# Patient Record
(demographics unavailable — no encounter records)

---

## 2025-02-07 NOTE — IMAGING
[de-identified] : The patient is a well appearing 19 year  old male of their stated age. Patient ambulates with a normal gait.   Ribs: Deformity: None Tenderness to Palpation: None Rib Compression Test: Negative   Thoracic Spine: Range of Motion: Unrestricted Deformity: None Tenderness to Palpation: None Sensation: Intact to Light Touch   Lumbar Spine: RANGE OF MOTION:      Flexion: Unrestricted and without pain      Extension: Unrestricted and without pain      Rotation: Unrestricted and without pain   TENDERNESS TO PALPATION:      Midline/Vertebral Bodies/Spinous Processes: Negative      Paraspinal Muscles: TENDER, LEFT LOWER      SI Joints:  Negative   PROVOCATIVE TESTING:      Piriformis Stretch Test: Negative      Straight Leg Raise:  Negative      Seated Straight Leg Raise: Negative      Pelvic Compression Test: Negative      INSPECTION:         Deformity: Negative         Erythema: Negative         Ecchymosis: Negative         Abrasions: Negative         Muscle Spasm: POSITIVE          NEUROLOGIC EXAM:         Sensation L2-S1: Grossly Intact         DTRs: 2+ and Symmetric         Babinski: Negative         Clonus: Negative   MOTOR EXAM:         Quadriceps: 5 out of 5         Hamstrings: 5 out of 5         Hip ABduction: 5 out of 5         Hip ADduction: 5 out of 5         Hip Flexion: 5 out of 5         TA: 5 out of 5         GS: 5 out of 5         EHL: 5 out of 5         FHL: 5 out of 5   Circulatory/Pulses:         Dorsalis Pedis:      2+         Posterior Tibialis: 2+     Assessment: The patient is a 19-year-old male with lower back pain and radiographic and physical exam findings consistent with lumbar strain.  The patient's condition is acute Documents/Results Reviewed Today: X-Ray lumbar spine  Tests/Studies Independently Interpreted Today: X-Ray lumbar spine is benign, no obvious bony abnormalities.  Pertinent findings include: tender left sided lumbar paraspinal muscles, nontender spinous process, no pain at hyperextension, intact strength throughout bilateral lower extremities  Confounding medical conditions/concerns: None   Plan: Advised the patient that his clinical examination and radiographic imaging is consistent with a paraspinal muscle strain. We are not concerned for any bony stress related injury at this time given nontender vertebral processes. Patient will start physical therapy, HEP, and stretching. Prescribed patient Naproxen 500mg BID x 2 weeks, then PRN for pain management and inflammation - use as directed and take with food. We will follow him accordingly in [employer] training room. He will shut down for one week. Modify activity as discussed.  Tests Ordered: None  Prescription Medications Ordered: Naproxen 500mg  Braces/DME Ordered: None Activity/Work/Sports Status: Shut down for one week  Additional Instructions: None Follow-Up: Next Wednesday in training room   The patient's current medication management of their orthopedic diagnosis was reviewed today: The patient was prescribed Naprosyn 500mg BID for two weeks and then as needed.  (1) We discussed a comprehensive treatment plan that included possible pharmaceutical management involving the use of prescription strength medications including but not limited to options such as oral Naprosyn 500mg BID, once daily Meloxicam 15 mg, or 500-650 mg Tylenol versus over the counter oral medications and topical prescription NSAID Pennsaid vs over the counter Voltaren gel.  Based on our extensive discussion, the patient was prescribed Naprosyn 500mg BID for two weeks.  It will then be used PRN for pain, inflammation and discomfort. (2) There is a moderate risk of morbidity with further treatment, especially from use of prescription strength medications and possible side effects of these medications which include upset stomach with oral medications, skin reactions to topical medications and cardiac/renal issues with long term use. (3) I recommended that the patient follow-up with their medical physician to discuss any significant specific potential issues with long term medication use such as interactions with current medications or with exacerbation of underlying medical comorbidities. (4) The benefits and risks associated with use of injectable, oral or topical, prescription and over the counter anti-inflammatory medications were discussed with the patient. The patient voiced understanding of the risks including but not limited to bleeding, stroke, kidney dysfunction, heart disease, and were referred to the black box warning label for further information.  Gayle COLE attest that this documentation has been prepared under the direction and in the presence of Provider Dr. Theo Wynne.   The documentation recorded by the scribe accurately reflects the services IDr. Theo, personally performed and the decisions made by me.  [FreeTextEntry1] : X-Ray lumbar spine is benign, no obvious bony abnormalities.

## 2025-02-07 NOTE — IMAGING
[de-identified] : The patient is a well appearing 19 year  old male of their stated age. Patient ambulates with a normal gait.   Ribs: Deformity: None Tenderness to Palpation: None Rib Compression Test: Negative   Thoracic Spine: Range of Motion: Unrestricted Deformity: None Tenderness to Palpation: None Sensation: Intact to Light Touch   Lumbar Spine: RANGE OF MOTION:      Flexion: Unrestricted and without pain      Extension: Unrestricted and without pain      Rotation: Unrestricted and without pain   TENDERNESS TO PALPATION:      Midline/Vertebral Bodies/Spinous Processes: Negative      Paraspinal Muscles: TENDER, LEFT LOWER      SI Joints:  Negative   PROVOCATIVE TESTING:      Piriformis Stretch Test: Negative      Straight Leg Raise:  Negative      Seated Straight Leg Raise: Negative      Pelvic Compression Test: Negative      INSPECTION:         Deformity: Negative         Erythema: Negative         Ecchymosis: Negative         Abrasions: Negative         Muscle Spasm: POSITIVE          NEUROLOGIC EXAM:         Sensation L2-S1: Grossly Intact         DTRs: 2+ and Symmetric         Babinski: Negative         Clonus: Negative   MOTOR EXAM:         Quadriceps: 5 out of 5         Hamstrings: 5 out of 5         Hip ABduction: 5 out of 5         Hip ADduction: 5 out of 5         Hip Flexion: 5 out of 5         TA: 5 out of 5         GS: 5 out of 5         EHL: 5 out of 5         FHL: 5 out of 5   Circulatory/Pulses:         Dorsalis Pedis:      2+         Posterior Tibialis: 2+     Assessment: The patient is a 19-year-old male with lower back pain and radiographic and physical exam findings consistent with lumbar strain.  The patient's condition is acute Documents/Results Reviewed Today: X-Ray lumbar spine  Tests/Studies Independently Interpreted Today: X-Ray lumbar spine is benign, no obvious bony abnormalities.  Pertinent findings include: tender left sided lumbar paraspinal muscles, nontender spinous process, no pain at hyperextension, intact strength throughout bilateral lower extremities  Confounding medical conditions/concerns: None   Plan: Advised the patient that his clinical examination and radiographic imaging is consistent with a paraspinal muscle strain. We are not concerned for any bony stress related injury at this time given nontender vertebral processes. Patient will start physical therapy, HEP, and stretching. Prescribed patient Naproxen 500mg BID x 2 weeks, then PRN for pain management and inflammation - use as directed and take with food. We will follow him accordingly in [employer] training room. He will shut down for one week. Modify activity as discussed.  Tests Ordered: None  Prescription Medications Ordered: Naproxen 500mg  Braces/DME Ordered: None Activity/Work/Sports Status: Shut down for one week  Additional Instructions: None Follow-Up: Next Wednesday in training room   The patient's current medication management of their orthopedic diagnosis was reviewed today: The patient was prescribed Naprosyn 500mg BID for two weeks and then as needed.  (1) We discussed a comprehensive treatment plan that included possible pharmaceutical management involving the use of prescription strength medications including but not limited to options such as oral Naprosyn 500mg BID, once daily Meloxicam 15 mg, or 500-650 mg Tylenol versus over the counter oral medications and topical prescription NSAID Pennsaid vs over the counter Voltaren gel.  Based on our extensive discussion, the patient was prescribed Naprosyn 500mg BID for two weeks.  It will then be used PRN for pain, inflammation and discomfort. (2) There is a moderate risk of morbidity with further treatment, especially from use of prescription strength medications and possible side effects of these medications which include upset stomach with oral medications, skin reactions to topical medications and cardiac/renal issues with long term use. (3) I recommended that the patient follow-up with their medical physician to discuss any significant specific potential issues with long term medication use such as interactions with current medications or with exacerbation of underlying medical comorbidities. (4) The benefits and risks associated with use of injectable, oral or topical, prescription and over the counter anti-inflammatory medications were discussed with the patient. The patient voiced understanding of the risks including but not limited to bleeding, stroke, kidney dysfunction, heart disease, and were referred to the black box warning label for further information.  Gayle COLE attest that this documentation has been prepared under the direction and in the presence of Provider Dr. Theo Wynne.   The documentation recorded by the scribe accurately reflects the services IDr. Theo, personally performed and the decisions made by me.  [FreeTextEntry1] : X-Ray lumbar spine is benign, no obvious bony abnormalities.
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
## 2025-04-25 NOTE — HISTORY OF PRESENT ILLNESS
[de-identified] : The patient is a 20 year old right hand dominant male who presents today complaining of right elbow pain.  Date of Injury/Onset: 4/21/25 Pain:    At Rest: 0/10  With Activity:  6/10  Mechanism of injury: Gradual onset of pain with playing tennis  This is NOT a Work Related Injury being treated under Worker's Compensation. This IS an athletic injury occurring associated with an interscholastic or organized sports team. Quality of symptoms: medial elbow pain Improves with: rest Worse with: hitting octaviano when serving  Prior treatment: none Prior Imaging: none Out of work/sport: Currently playing sports modified (doubles only) School/Sport/Position/Occupation: [employer]  Additional Information: None

## 2025-04-25 NOTE — IMAGING
[Right] : right elbow [de-identified] : The patient is a well appearing 20 year old male of their stated age. Neck is supple & nontender to palpation. Negative Spurling's test.    Right Shoulder:   ROM:   Forward Flexion: 180 degrees Abduction: 180 degrees ER at 90: 90 degrees IR at 90: 45 degrees ER at N: 50 degrees Motor: Abduction: 5 out of 5 FPS: 5 out of 5 Flexion: 5 out of 5 Internal Rotation: 5 out of 5 External Rotation: 5 out of 5 Provocative Testing: Impingement: Negative Parke's: Negative Other: N/A    Left Shoulder:   ROM: Forward Flexion: 180 degrees Abduction: 180 degrees ER at 90: 90 degrees IR at 90: 45 degrees ER at N: 50 degrees Motor: Abduction: 5 out of 5 FPS: 5 out of 5 Flexion: 5 out of 5 Internal Rotation: 5 out of 5 External Rotation: 5 out of 5 Provocative Testing: Impingement: Negative Parke's: Negative Other: N/A   -----------------------------------   Effected Elbow: RIGHT ROM: Flexion: 2-145 degrees Supination: 90 degrees Pronation: 90 degrees    Inspection: Erythema: None Ecchymosis: None Abrasions: None Effusion: None Deformity: None   Palpation: Crepitus: None Medial Epicondyle/Flexor-Pronator: TENDER  Lateral Epicondyle/ECRB: Nontender Olecranon: Nontender Radial Head: Nontender Humeral Head: Nontender Distal Biceps: Nontender Distal Triceps: Nontender Flexor-Pronator: Nontender Extensor/ECRB: Nontender UCL: Nontender Pronator Intersection: Nontender Ulnar Nerve:  Stable & Nontender   Stress Testing: Varus at 0 Degrees: Stable Varus at 30 Degrees: Stable Valgus at 0 Degrees: Stable Valgus at 30 Degrees: Stable    Motor: Elbow Flexion: 5 out of 5 Elbow Extension: 5 out of 5 Supination: 5 out of 5 Pronation: 5 out of 5 Wrist Flexion: 5 out of 5 Wrist Extension: 5 out of 5 Interossei: 5 out of 5 : 5 out of 5    Provocative Testing: Milking: Negative Moving Valgus Stress: Negative Posterolateral R-I: Negative Hook Test: Negative Resisted Wrist Extension: No pain Resisted Index Finger Extension: No Pain Resisted Middle Finger Extension: No Pain Resisted Wrist Flexion: No Pain Resisted Pronation: No Pain Neurologic Exam: Axillary Nerve:  SLT Radial Nerve: SLT Median Nerve: SLT Ulnar Nerve:  SLT Other:  N/A   Vascular Exam: Radial Pulse: 2+ Ulnar Pulse: 2+ Capillary Refill: <2 Seconds Other Exams: None Pertinent Contralateral Elbow Findings: None    Assessment: The patient is a 20-year-old man with right elbow pain and radiographic and physical exam findings consistent with medial epicondylitis  The patient's condition is acute Documents/Results Reviewed Today: X-Ray right elbow Tests/Studies Independently Interpreted Today: X-Ray right elbow is benign, no obvious bony abnormalities Pertinent findings include: 2-145, 90/90,180/180/90/35/50, Tender medial epicondyle,  Confounding medical conditions/concerns: None    Plan: The patient will begin physical therapy, HEP, and stretching for medial epicondylitis. Recommended the patient obtain a Reparel elbow sleeve and topical Voltaren gel to aid in inflammation. Prescribed the patient Naproxen 500 mg BID for pain management and inflammation - use as directed and take with food. Reviewed all proper activity modifications to avoid aggravation of medial epicondylitis to include changing -checking his  on tennis racket. The patient is allowed to play in tennis as tolerated but if pain persists, we will obtain an MRI.  Modify activity as discussed. Tests Ordered: None Prescription Medications Ordered: Naproxen 500mg Braces/DME Ordered: Reparel,  Activity/Work/Sports Status: As tolerated Additional Instructions: Topical Voltaren gel, change - check  on tennis racket Follow-Up: Wednesday in training room   The patient's current medication management of their orthopedic diagnosis was reviewed today: The patient declined and/or was contraindicated for the recommended prescription medication Naprosyn and will use over the counter Advil, Alleve, Voltaren Gel or Tylenol as directed.  (1) We discussed a comprehensive treatment plan that included possible pharmaceutical management involving the use of prescription strength medications versus over the counter oral medications and topical prescription vs over the counter medications.  Based on our extensive discussion, the patient declined prescription medication and will use over the counter Advil, Aleve, Voltaren Gel or Tylenol as directed. (2) There is a moderate risk of morbidity with further treatment, especially from use of prescription strength medications and possible side effects of these medications which include upset stomach with oral medications, skin reactions to topical medications and cardiac/renal issues with long term use. (3) I recommended that the patient follow-up with their medical physician to discuss any significant specific potential issues with long term medication use such as interactions with current medications or with exacerbation of underlying medical comorbidities. (4) The benefits and risks associated with use of injectable, oral or topical, prescription and over the counter anti-inflammatory medications were discussed with the patient. The patient voiced understanding of the risks including but not limited to bleeding, stroke, kidney dysfunction, heart disease, and were referred to the black box warning label for further information.  [place] attest that this documentation has been prepared under the direction and in the presence of Damien Gordillo PA-C.  The documentation recorded by the scribe accurately reflects the service I Damien Gordillo PA-C personally performed and the decisions made by me.   [FreeTextEntry1] : X-Ray right elbow is benign, no obvious bony abnormalities